# Patient Record
(demographics unavailable — no encounter records)

---

## 2024-10-08 NOTE — DISCUSSION/SUMMARY
[Normal Growth] : growth [Normal Development] : development  [No Elimination Concerns] : elimination [Continue Regimen] : feeding [No Skin Concerns] : skin [Normal Sleep Pattern] : sleep [None] : no medical problems [Anticipatory Guidance Given] : Anticipatory guidance addressed as per the history of present illness section [Age Approp Vaccines] : Age appropriate vaccines administered [No Medications] : ~He/She~ is not on any medications [Parent/Guardian] : Parent/Guardian [] : The components of the vaccine(s) to be administered today are listed in the plan of care. The disease(s) for which the vaccine(s) are intended to prevent and the risks have been discussed with the caretaker.  The risks are also included in the appropriate vaccination information statements which have been provided to the patient's caregiver.  The caregiver has given consent to vaccinate. [FreeTextEntry1] : [place] screening review and referral as appropriate.  Review of when and how to  start solids based on age, development, and current  intake formula or breast milk.  Exclusive Breast feeding to 6 months lowers the risk of infection. But introduction of solids before 6 months may lower the risk of allergy. Therefore, formula fed infants may benefit from introduction of solids between 4 and 6 months if developmentally ready. Infants are developmentally ready when they show signs of being ready to sit on their own and they have good head control. Feeding strategies that reduce risk of allergy reviewed.  Vitamin D for breast feeding infants  Tummy time when awake.  Put baby to sleep on back, in own crib with no loose or soft bedding. Help baby to develop sleep and feeding routines.  If formula is needed, recommend iron-fortified formulations, 2-4 oz every 2-3 hrs.  When in car, patient should be in rear-facing car seat in back seat.  Pacifier benefits reviewed  [email or web address] reviewed

## 2024-10-08 NOTE — PHYSICAL EXAM
[Alert] : alert [Normocephalic] : normocephalic [Flat Open Anterior [place]] : flat open anterior fontanelle [Red Reflex] : red reflex bilateral [PERRL] : PERRL [Normally Placed Ears] : normally placed ears [Auricles Well Formed] : auricles well formed [Clear Tympanic membranes] : clear tympanic membranes [Light reflex present] : light reflex present [Bony landmarks visible] : bony landmarks visible [Nares Patent] : nares patent [Palate Intact] : palate intact [Uvula Midline] : uvula midline [Symmetric Chest Rise] : symmetric chest rise [Clear to Auscultation Bilaterally] : clear to auscultation bilaterally [Regular Rate and Rhythm] : regular rate and rhythm [S1, S2 present] : S1, S2 present [+2 Femoral Pulses] : (+) 2 femoral pulses [Soft] : soft [Bowel Sounds] : bowel sounds present [Central Urethral Opening] : central urethral opening [Testicles Descended] : testicles descended bilaterally [Patent] : patent [Normally Placed] : normally placed [No Abnormal Lymph Nodes Palpated] : no abnormal lymph nodes palpated [Startle Reflex] : startle reflex present [Plantar Grasp] : plantar grasp reflex present [Symmetric [place]] : symmetric lopez [Acute Distress] : no acute distress [Discharge] : no discharge [Palpable Masses] : no palpable masses [Murmurs] : no murmurs [Tender] : nontender [Distended] : nondistended [Hepatomegaly] : no hepatomegaly [Splenomegaly] : no splenomegaly [Cage-Ortolani] : negative Cage-Ortolani [Allis Sign] : negative Allis sign [Spinal Dimple] : no spinal dimple [Tuft of Hair] : no tuft of hair [Rash or Lesions] : no rash/lesions

## 2024-12-09 NOTE — PHYSICAL EXAM
[Alert] : alert [Normocephalic] : normocephalic [Flat Open Anterior [place]] : flat open anterior fontanelle [Red Reflex] : red reflex bilateral [PERRL] : PERRL [Normally Placed Ears] : normally placed ears [Auricles Well Formed] : auricles well formed [Clear Tympanic membranes] : clear tympanic membranes [Light reflex present] : light reflex present [Bony landmarks visible] : bony landmarks visible [Nares Patent] : nares patent [Palate Intact] : palate intact [Uvula Midline] : uvula midline [Supple, full passive range of motion] : supple, full passive range of motion [Symmetric Chest Rise] : symmetric chest rise [Clear to Auscultation Bilaterally] : clear to auscultation bilaterally [Regular Rate and Rhythm] : regular rate and rhythm [S1, S2 present] : S1, S2 present [+2 Femoral Pulses] : (+) 2 femoral pulses [Soft] : soft [Bowel Sounds] : bowel sounds present [Central Urethral Opening] : central urethral opening [Testicles Descended] : testicles descended bilaterally [Patent] : patent [Normally Placed] : normally placed [No Abnormal Lymph Nodes Palpated] : no abnormal lymph nodes palpated [Symmetric Buttocks Creases] : symmetric buttocks creases [Plantar Grasp] : plantar grasp reflex present [Cranial Nerves Grossly Intact] : cranial nerves grossly intact [Acute Distress] : no acute distress [Discharge] : no discharge [Tooth Eruption] : no tooth eruption [Palpable Masses] : no palpable masses [Murmurs] : no murmurs [Tender] : nontender [Distended] : nondistended [Hepatomegaly] : no hepatomegaly [Splenomegaly] : no splenomegaly [Cage-Ortolani] : negative Cage-Ortolani [Allis Sign] : negative Allis sign [Spinal Dimple] : no spinal dimple [Tuft of Hair] : no tuft of hair [de-identified] : Mild eczema

## 2024-12-09 NOTE — DISCUSSION/SUMMARY
[Normal Growth] : growth [Normal Development] : development [None] : No medical problems [No Elimination Concerns] : elimination [No Feeding Concerns] : feeding [No Skin Concerns] : skin [Normal Sleep Pattern] : sleep [No Medications] : ~He/She~ is not on any medications [Parent/Guardian] : parent/guardian [] : The components of the vaccine(s) to be administered today are listed in the plan of care. The disease(s) for which the vaccine(s) are intended to prevent and the risks have been discussed with the caretaker.  The risks are also included in the appropriate vaccination information statements which have been provided to the patient's caregiver.  The caregiver has given consent to vaccinate. [FreeTextEntry1] : Per Protocol: Lead Risk Factor Screening OAE and Go CHeck Screening  [place] Oral Screen:  When teeth erupt wipe daily with washcloth or soft brush and fluoride free toothpaste. Fluoride is recommended for children in [employer]. However, avoid other fluoride sources. Avoid triggers of tooth decay such as falling asleep with a bottle in the mouth.   Recommend breastfeeding, 8-12 feedings per day. If formula is needed, 2-4 oz every 3-4 hrs. Introduce single-ingredient foods rich in iron, one at a time. Incorporate up to 4 oz of fluorinated water daily in a sippy cup.  Rear-facing car seat in back seat. Place babies to sleep on their back, in their own crib or bassinet, with no loose or soft bedding. Lower crib mattress.   May offer pacifier if needed. Continue tummy time when awake.  Ensure home is safe since baby is now more mobile. Do not use infant walker.  Read aloud to baby.     Nurse Vaccine Visit: Flu and Beyfortus Timing not necessary to manage (anytime/day)

## 2024-12-19 NOTE — PHYSICAL EXAM
[No Acute Distress] : no acute distress [Clear] : left tympanic membrane clear [Erythema] : erythema [Bulging] : bulging [NL] : no abnormal lymph nodes palpated [Warm] : warm

## 2024-12-19 NOTE — HISTORY OF PRESENT ILLNESS
[FreeTextEntry6] : FIDEL MERRILL is a 6 month old male presenting for complaints of cough and bloody mucus which was seen in his crib.  He has been having trouble sleeping for the last few days.  No fever.  No .  Eating well.

## 2024-12-19 NOTE — DISCUSSION/SUMMARY
[FreeTextEntry1] : 6 mo here with R serous AOM.  Patient has been diagnosed with acute otitis media.  If prescribed, complete course of antibiotics.  Supportive measures including Tylenol and Ibuprofen can be used as needed for pain or fever. If patient fails to improve within the next 1-3 days parent/patient understands to follow up. Follow up PRN worsening symptoms, persistent fever of 100.4 or more or failure to improve.

## 2025-03-13 NOTE — HISTORY OF PRESENT ILLNESS
[Mother] : mother [Well-balanced] : well-balanced [Normal] : Normal [In Crib] : sleeps in crib [Co-sleeping] : no co-sleeping [Brushing teeth] : brushing teeth [No] : No cigarette smoke exposure [Water heater temperature set at <120 degrees F] : Water heater temperature set at <120 degrees F [Rear facing car seat in  back seat] : Rear facing car seat in  back seat [Carbon Monoxide Detectors] : Carbon monoxide detectors [Smoke Detectors] : Smoke detectors [Up to date] : Up to date [de-identified] : sleep training now [de-identified] : Goat milk formula  [NO] : No

## 2025-03-13 NOTE — HISTORY OF PRESENT ILLNESS
[Mother] : mother [Well-balanced] : well-balanced [Normal] : Normal [In Crib] : sleeps in crib [Co-sleeping] : no co-sleeping [Brushing teeth] : brushing teeth [No] : No cigarette smoke exposure [Water heater temperature set at <120 degrees F] : Water heater temperature set at <120 degrees F [Rear facing car seat in  back seat] : Rear facing car seat in  back seat [Carbon Monoxide Detectors] : Carbon monoxide detectors [Smoke Detectors] : Smoke detectors [Up to date] : Up to date [de-identified] : sleep training now [de-identified] : Goat milk formula  [NO] : No

## 2025-03-13 NOTE — DISCUSSION/SUMMARY
[Normal Growth] : growth [Normal Development] : development [None] : No known medical problems [No Elimination Concerns] : elimination [No Feeding Concerns] : feeding [No Skin Concerns] : skin [Family Adaptation] : family adaptation [Infant [place]] : infant independence [Feeding Routine] : feeding routine [Safety] : safety [No Medications] : ~He/She~ is not on any medications [Parent/Guardian] : parent/guardian [Mother] : mother [de-identified] : Continue sleep training, discussed consistent routine.  [] : The components of the vaccine(s) to be administered today are listed in the plan of care. The disease(s) for which the vaccine(s) are intended to prevent and the risks have been discussed with the caretaker.  The risks are also included in the appropriate vaccination information statements which have been provided to the patient's caregiver.  The caregiver has given consent to vaccinate. [FreeTextEntry1] : Continue breast-milk or formula as desired. Increase table foods, 3 meals with 2-3 snacks per day. No juice. Incorporate up to 6 oz of fluorinated water daily in a sippy cup. Discussed weaning of bottle and pacifier. Wipe teeth daily with washcloth. When in car, patient should be in rear-facing car seat in back seat. Put baby to sleep in own crib with no loose or soft bedding. Lower crib mattress. Help baby to maintain consistent daily routines and sleep schedule. Recognize stranger anxiety. Ensure home is safe since baby is increasingly mobile. Be within arm's reach of baby at all times. Use consistent, positive discipline. Avoid screen time except for video chatting. Read aloud to baby. Use of SPF 30 or more with reapplication and tick checks every 12 hours when playing outside. Poison control discussed. Water safety discussed. Use of a [facility] approved life jacket with designated water watcher. Return in 3 months for 1 year well.

## 2025-03-13 NOTE — PHYSICAL EXAM
[Alert] : alert [Acute Distress] : no acute distress [Normocephalic] : normocephalic [Flat Open Anterior [place]] : flat open anterior fontanelle [Red Reflex] : red reflex bilateral [Excessive Tearing] : no excessive tearing [PERRL] : PERRL [Normally Placed Ears] : normally placed ears [Auricles Well Formed] : auricles well formed [Clear Tympanic membranes] : clear tympanic membranes [Light reflex present] : light reflex present [Bony landmarks visible] : bony landmarks visible [Discharge] : no discharge [Nares Patent] : nares patent [Palate Intact] : palate intact [Uvula Midline] : uvula midline [Supple, full passive range of motion] : supple, full passive range of motion [Palpable Masses] : no palpable masses [Symmetric Chest Rise] : symmetric chest rise [Clear to Auscultation Bilaterally] : clear to auscultation bilaterally [Regular Rate and Rhythm] : regular rate and rhythm [S1, S2 present] : S1, S2 present [Murmurs] : no murmurs [+2 Femoral Pulses] : (+) 2 femoral pulses [Soft] : soft [Tender] : nontender [Distended] : nondistended [Bowel Sounds] : bowel sounds present [Hepatomegaly] : no hepatomegaly [Splenomegaly] : no splenomegaly [Normal External Genitalia] : normal external genitalia [Circumcised] : circumcised [Central Urethral Opening] : central urethral opening [Testicles Descended] : testicles descended bilaterally [No Abnormal Lymph Nodes Palpated] : no abnormal lymph nodes palpated [Symmetric Abduction and Rotation of hips] : symmetric abduction and rotation of hips [Allis Sign] : negative Allis sign [Straight] : straight [Cranial Nerves Grossly Intact] : cranial nerves grossly intact [Rash or Lesions] : no rash/lesions

## 2025-03-13 NOTE — DISCUSSION/SUMMARY
[Normal Growth] : growth [Normal Development] : development [None] : No known medical problems [No Elimination Concerns] : elimination [No Feeding Concerns] : feeding [No Skin Concerns] : skin [Family Adaptation] : family adaptation [Infant [place]] : infant independence [Feeding Routine] : feeding routine [Safety] : safety [No Medications] : ~He/She~ is not on any medications [Parent/Guardian] : parent/guardian [Mother] : mother [de-identified] : Continue sleep training, discussed consistent routine.  [] : The components of the vaccine(s) to be administered today are listed in the plan of care. The disease(s) for which the vaccine(s) are intended to prevent and the risks have been discussed with the caretaker.  The risks are also included in the appropriate vaccination information statements which have been provided to the patient's caregiver.  The caregiver has given consent to vaccinate. [FreeTextEntry1] : Continue breast-milk or formula as desired. Increase table foods, 3 meals with 2-3 snacks per day. No juice. Incorporate up to 6 oz of fluorinated water daily in a sippy cup. Discussed weaning of bottle and pacifier. Wipe teeth daily with washcloth. When in car, patient should be in rear-facing car seat in back seat. Put baby to sleep in own crib with no loose or soft bedding. Lower crib mattress. Help baby to maintain consistent daily routines and sleep schedule. Recognize stranger anxiety. Ensure home is safe since baby is increasingly mobile. Be within arm's reach of baby at all times. Use consistent, positive discipline. Avoid screen time except for video chatting. Read aloud to baby. Use of SPF 30 or more with reapplication and tick checks every 12 hours when playing outside. Poison control discussed. Water safety discussed. Use of a [facility] approved life jacket with designated water watcher. Return in 3 months for 1 year well.

## 2025-03-13 NOTE — PHYSICAL EXAM
[Alert] : alert [Acute Distress] : no acute distress [Normocephalic] : normocephalic [Flat Open Anterior [place]] : flat open anterior fontanelle [Red Reflex] : red reflex bilateral [Excessive Tearing] : no excessive tearing [PERRL] : PERRL [Normally Placed Ears] : normally placed ears [Auricles Well Formed] : auricles well formed [Clear Tympanic membranes] : clear tympanic membranes [Light reflex present] : light reflex present [Bony landmarks visible] : bony landmarks visible [Discharge] : no discharge [Nares Patent] : nares patent [Palate Intact] : palate intact [Uvula Midline] : uvula midline [Supple, full passive range of motion] : supple, full passive range of motion [Palpable Masses] : no palpable masses [Symmetric Chest Rise] : symmetric chest rise [Clear to Auscultation Bilaterally] : clear to auscultation bilaterally [Regular Rate and Rhythm] : regular rate and rhythm [S1, S2 present] : S1, S2 present [Murmurs] : no murmurs [+2 Femoral Pulses] : (+) 2 femoral pulses [Soft] : soft [Tender] : nontender [Distended] : nondistended [Bowel Sounds] : bowel sounds present [Hepatomegaly] : no hepatomegaly [Splenomegaly] : no splenomegaly [Normal External Genitalia] : normal external genitalia [Circumcised] : circumcised [Central Urethral Opening] : central urethral opening [Testicles Descended] : testicles descended bilaterally [No Abnormal Lymph Nodes Palpated] : no abnormal lymph nodes palpated [Symmetric Abduction and Rotation of hips] : symmetric abduction and rotation of hips [Allis Sign] : negative Allis sign [Straight] : straight [Cranial Nerves Grossly Intact] : cranial nerves grossly intact [Rash or Lesions] : no rash/lesions

## 2025-06-01 NOTE — HISTORY OF PRESENT ILLNESS
[de-identified] : rash [FreeTextEntry6] : FIDEL MERRILL is a 11 month old male presenting for complaints of rash. under chin x 1 week.. Tugging on ear and difficulty sleeping overnight last few nights.  No fevers.  Drinking well and making wet diapers.

## 2025-06-01 NOTE — PHYSICAL EXAM
[No Acute Distress] : no acute distress [Clear] : right tympanic membrane clear [Erythema] : erythema [Bulging] : bulging [Tooth Eruption] : tooth eruption  [Moves All Extremities x 4] : moves all extremities x4 [NL] : no abnormal lymph nodes palpated [Normotonic] : normotonic [de-identified] : erythematous maculopapular rash to neck folds

## 2025-06-01 NOTE — HISTORY OF PRESENT ILLNESS
[de-identified] : rash [FreeTextEntry6] : FIDEL MERRILL is a 11 month old male presenting for complaints of rash. under chin x 1 week.. Tugging on ear and difficulty sleeping overnight last few nights.  No fevers.  Drinking well and making wet diapers.

## 2025-06-01 NOTE — DISCUSSION/SUMMARY
[FreeTextEntry1] : 11 mo here with AOM and irritant dermatitis.  Patient has been diagnosed with acute otitis media.  If prescribed, complete course of antibiotics.  Supportive measures including Tylenol and Ibuprofen can be used as needed for pain or fever. If patient fails to improve within the next 1-3 days parent/patient understands to follow up.  Complete antibiotic course. Potential side effect of antibiotics includes but not limited to diarrhea. Provide ibuprofen as needed for pain or fever. If no improvement within 48 hours return for re-evaluation.  Untreated ear infections may lead to: Permanent hearing loss Problems with speech and language development Spread of infection to neighboring tissues and organs, such as mastoiditis or meningitis  Treat as directed and follow up as needed for fever trend, new, or worsening symptoms. Call or return if rash or significant vomiting develops after starting medication. Some side effects of medication that may not need special treatments include loose BMs.  Avoid introduction of new products for now given dermatitis.  OK to use topical Hydrocortisone PRN itching, avoid hot bathing.  Avoid skin lotions and soaps that are scented. Avoid detergents and dryer sheets that are scented.  Wash all bedding/towels in hot water.  Wash body and change clothing after playing outside or around animals.

## 2025-06-01 NOTE — PHYSICAL EXAM
[No Acute Distress] : no acute distress [Clear] : right tympanic membrane clear [Erythema] : erythema [Bulging] : bulging [Tooth Eruption] : tooth eruption  [Moves All Extremities x 4] : moves all extremities x4 [NL] : no abnormal lymph nodes palpated [Normotonic] : normotonic [de-identified] : erythematous maculopapular rash to neck folds

## 2025-06-14 NOTE — PHYSICAL EXAM
[Clear] : right tympanic membrane clear [Erythema] : no erythema [Bulging] : not bulging [Purulent Effusion] : no purulent effusion [Clear to Auscultation Bilaterally] : clear to auscultation bilaterally [No Abnormal Lymph Nodes Palpated] : no abnormal lymph nodes palpated [NL] : warm, clear [de-identified] : dry eczematous patches on the chin and chest

## 2025-06-14 NOTE — DISCUSSION/SUMMARY
[FreeTextEntry1] : Pt with likely viral URI. Supportive measures for upper respiratory infection were discussed. Such measures include use of nasal saline and suction as needed to clear the nasal passages, increasing fluids, hot showers or steam from the bathroom, propping the child up on a second pillow (for children > 1 year old), use of an OTC home remedy such as vapor rub for comfort and giving 1 tablespoon of honey an hour before bedtime for cough. (Honey is only to be given for children 1 year of age and older). Tylenol can be used every 4 hours as needed for fever or pain and Motrin can be used every 6 hours as needed for fever or pain. If child has a fever of 100.4 or more or symptoms are worsening at any time, return for recheck or seek other medical attention.

## 2025-06-14 NOTE — HISTORY OF PRESENT ILLNESS
[de-identified] : fever [FreeTextEntry6] : Fever x 3 days, Tmax 100.8F. No fever today but has been ear pulling. Mild cough. Concerned about recurrence of ear infection.

## 2025-06-23 NOTE — PHYSICAL EXAM
[Alert] : alert [Normocephalic] : normocephalic [Closed Anterior [place]] : closed anterior fontanelle [Red Reflex] : red reflex bilateral [PERRL] : PERRL [Normally Placed Ears] : normally placed ears [Auricles Well Formed] : auricles well formed [Clear Tympanic membranes] : clear tympanic membranes [Light reflex present] : light reflex present [Bony landmarks visible] : bony landmarks visible [Nares Patent] : nares patent [Palate Intact] : palate intact [Uvula Midline] : uvula midline [Tooth Eruption] : tooth eruption [Supple, full passive range of motion] : supple, full passive range of motion [Symmetric Chest Rise] : symmetric chest rise [Clear to Auscultation Bilaterally] : clear to auscultation bilaterally [Regular Rate and Rhythm] : regular rate and rhythm [S1, S2 present] : S1, S2 present [+2 Femoral Pulses] : (+) 2 femoral pulses [Soft] : soft [Bowel Sounds] : normoactive bowel sounds [Central Urethral Opening] : central urethral opening [Testicles Descended] : testicles descended bilaterally [No Abnormal Lymph Nodes Palpated] : no abnormal lymph nodes palpated [Symmetric Abduction and Rotation of Hips] : symmetric abduction and rotation of hips [Straight] : straight [Cranial Nerves Grossly Intact] : cranial nerves grossly intact [Discharge] : no discharge [Palpable Masses] : no palpable masses [Murmurs] : no murmurs [Tender] : nontender [Distended] : nondistended [Hepatomegaly] : no hepatomegaly [Splenomegaly] : no splenomegaly [Allis Sign] : negative Allis sign [Rash or Lesions] : no rash/lesions [de-identified] : diastasis recti with small 1-2cm outpouching above the navel [de-identified] : dry eczematous patches under chin, around neck

## 2025-06-23 NOTE — HISTORY OF PRESENT ILLNESS
[Mother] : mother [Formula ___ oz/feed] : [unfilled] oz of formula per feed [Cow's milk ___ oz/feed] : [unfilled] oz of Cow's milk per feed [Fruit] : fruit [Vegetables] : vegetables [Meat] : meat [Dairy] : dairy [Table food] : table food [Normal] : Normal [Playtime] : Playtime  [Water heater temperature set at <120 degrees F] : Water heater temperature set at <120 degrees F [Car seat in back seat] : Car seat in back seat [Smoke Detectors] : Smoke detectors [Carbon Monoxide Detectors] : Carbon monoxide detectors [Pacifier use] : Pacifier use [Up to date] : Up to date

## 2025-06-23 NOTE — DISCUSSION/SUMMARY
[] : The components of the vaccine(s) to be administered today are listed in the plan of care. The disease(s) for which the vaccine(s) are intended to prevent and the risks have been discussed with the caretaker.  The risks are also included in the appropriate vaccination information statements which have been provided to the patient's caregiver.  The caregiver has given consent to vaccinate. [FreeTextEntry1] : Child growing and developing beautifully. Discussed transition to whole cow's milk. Continue table foods, 3 meals with 2-3 snacks per day. Incorporate up to 6 oz of fluorinated water daily in a sippy cup. Brush teeth twice a day with soft toothbrush. Recommend visit to dentist. When in car, keep child in rear-facing car seats until age 2, or until  the maximum height and weight for seat is reached. Put baby to sleep in own crib with no loose or soft bedding. Lower crib mattress. Help baby to maintain consistent daily routines and sleep schedule. Recognize stranger and separation anxiety. Ensure home is safe since baby is increasingly mobile. Be within arm's reach of baby at all times. Use consistent, positive discipline. Avoid screen time. Read aloud to baby.  Will receive MMR and PCV20 today. CBC and lead ordered. US of abdomen for possible abdominal wall hernia. Return precautions discussed. Next visit in 3 months for 15m WCC.

## 2025-06-23 NOTE — PHYSICAL EXAM
[Alert] : alert [Normocephalic] : normocephalic [Closed Anterior [employer]] : closed anterior fontanelle [Red Reflex] : red reflex bilateral [PERRL] : PERRL [Normally Placed Ears] : normally placed ears [Auricles Well Formed] : auricles well formed [Clear Tympanic membranes] : clear tympanic membranes [Light reflex present] : light reflex present [Bony landmarks visible] : bony landmarks visible [Nares Patent] : nares patent [Palate Intact] : palate intact [Uvula Midline] : uvula midline [Tooth Eruption] : tooth eruption [Supple, full passive range of motion] : supple, full passive range of motion [Symmetric Chest Rise] : symmetric chest rise [Clear to Auscultation Bilaterally] : clear to auscultation bilaterally [Regular Rate and Rhythm] : regular rate and rhythm [S1, S2 present] : S1, S2 present [+2 Femoral Pulses] : (+) 2 femoral pulses [Soft] : soft [Bowel Sounds] : normoactive bowel sounds [Central Urethral Opening] : central urethral opening [Testicles Descended] : testicles descended bilaterally [No Abnormal Lymph Nodes Palpated] : no abnormal lymph nodes palpated [Symmetric Abduction and Rotation of Hips] : symmetric abduction and rotation of hips [Straight] : straight [Cranial Nerves Grossly Intact] : cranial nerves grossly intact [Discharge] : no discharge [Palpable Masses] : no palpable masses [Murmurs] : no murmurs [Tender] : nontender [Distended] : nondistended [Hepatomegaly] : no hepatomegaly [Splenomegaly] : no splenomegaly [Allis Sign] : negative Allis sign [Rash or Lesions] : no rash/lesions [de-identified] : diastasis recti with small 1-2cm outpouching above the navel [de-identified] : dry eczematous patches under chin, around neck

## 2025-07-14 NOTE — DISCUSSION/SUMMARY
[FreeTextEntry1] : Pt with mildly dull right TM and mild erythema concerning for developing and/or resolving AOM. No symptoms at this time c/w AOM so will hold off oral abx. Will treat with Ciprodex gtt and reassess ears in 72 hours for AOM.

## 2025-07-14 NOTE — HISTORY OF PRESENT ILLNESS
[de-identified] : pulling ear  [FreeTextEntry6] : Pulling on right ear since coming back from vacation. Was splashing in the pool. No fevers or other symptoms. No sleep or appetite changes.

## 2025-07-14 NOTE — PHYSICAL EXAM
[NL] : no acute distress, alert [Erythema] : erythema [Bulging] : not bulging [Clear Effusion] : clear effusion [FreeTextEntry3] : dull right TM

## 2025-07-14 NOTE — HISTORY OF PRESENT ILLNESS
[de-identified] : pulling ear  [FreeTextEntry6] : Pulling on right ear since coming back from vacation. Was splashing in the pool. No fevers or other symptoms. No sleep or appetite changes.

## 2025-07-24 NOTE — PHYSICAL EXAM
[Erythema] : erythema [Purulent Effusion] : purulent effusion [NL] : warm, clear [Clear] : right tympanic membrane not clear

## 2025-07-24 NOTE — DISCUSSION/SUMMARY
[FreeTextEntry1] : Anticipatory guidance and parent education given. Take antibiotic as prescribed. Supportive care. Tylenol or Motrin as needed for discomfort/fever. Follow up in 3-4 weeks for re-check of affected ear(s). Follow up sooner if symptoms persist or worsen.

## 2025-07-24 NOTE — HISTORY OF PRESENT ILLNESS
[FreeTextEntry6] : BIB mom to recheck ears; was given ear drops; Scooter continues to pull at right ear.  Not applicable